# Patient Record
Sex: MALE | Race: WHITE | NOT HISPANIC OR LATINO | Employment: OTHER | URBAN - METROPOLITAN AREA
[De-identification: names, ages, dates, MRNs, and addresses within clinical notes are randomized per-mention and may not be internally consistent; named-entity substitution may affect disease eponyms.]

---

## 2017-02-01 ENCOUNTER — SEE NOTE (OUTPATIENT)
Dept: URBAN - METROPOLITAN AREA CLINIC 34 | Facility: CLINIC | Age: 82
Setting detail: DERMATOLOGY
End: 2017-02-01

## 2017-02-01 PROBLEM — L57.0 ACTINIC KERATOSIS: Status: RESOLVED | Noted: 2017-02-01

## 2017-02-01 PROBLEM — L71.8 OTHER ROSACEA: Status: RESOLVED | Noted: 2017-02-01

## 2017-02-01 PROBLEM — L57.0 ACTINIC KERATOSIS: Status: ACTIVE | Noted: 2017-02-01

## 2017-02-01 PROBLEM — L71.8 OTHER ROSACEA: Status: ACTIVE | Noted: 2017-02-01

## 2017-02-01 PROCEDURE — 17003 DESTRUCT PREMALG LES 2-14: CPT

## 2017-02-01 PROCEDURE — 17000 DESTRUCT PREMALG LESION: CPT

## 2017-02-01 PROCEDURE — 11307 SHAVE SKIN LESION 1.1-2.0 CM: CPT

## 2017-02-01 PROCEDURE — 99213 OFFICE O/P EST LOW 20 MIN: CPT

## 2017-02-01 RX ORDER — METRONIDAZOLE 7.5 MG/G
CREAM TOPICAL
Qty: 45 | Refills: 3 | Status: DISCONTINUED
Start: 2017-02-01 | End: 2017-12-27

## 2017-02-06 ENCOUNTER — RX ONLY (RX ONLY)
Age: 82
End: 2017-02-06

## 2017-02-06 RX ORDER — CLINDAMYCIN PHOSPHATE 10 MG/G
GEL TOPICAL
Qty: 60 | Refills: 2 | Status: DISCONTINUED
Start: 2017-02-06 | End: 2017-12-27

## 2017-02-07 ENCOUNTER — RX ONLY (RX ONLY)
Age: 82
End: 2017-02-07

## 2017-02-07 RX ORDER — METRONIDAZOLE 7.5 MG/G
CREAM TOPICAL
Qty: 45 | Refills: 3 | Status: DISCONTINUED
Start: 2017-02-07 | End: 2017-12-27

## 2017-02-14 ENCOUNTER — RX ONLY (RX ONLY)
Age: 82
End: 2017-02-14

## 2017-02-14 RX ORDER — METRONIDAZOLE 7.5 MG/G
GEL TOPICAL
Qty: 45 | Refills: 3 | Status: DISCONTINUED
Start: 2017-02-14 | End: 2017-12-27

## 2017-12-27 ENCOUNTER — SEE NOTE (OUTPATIENT)
Dept: URBAN - METROPOLITAN AREA CLINIC 34 | Facility: CLINIC | Age: 82
Setting detail: DERMATOLOGY
End: 2017-12-27

## 2017-12-27 ENCOUNTER — RX ONLY (RX ONLY)
Age: 82
End: 2017-12-27

## 2017-12-27 PROBLEM — L0390 682.9: Status: ACTIVE | Noted: 2017-12-27

## 2017-12-27 PROBLEM — L57.0 ACTINIC KERATOSIS: Status: RESOLVED | Noted: 2017-12-27

## 2017-12-27 PROBLEM — L0391 682.9: Status: ACTIVE | Noted: 2017-12-27

## 2017-12-27 PROBLEM — L57.0 ACTINIC KERATOSIS: Status: ACTIVE | Noted: 2017-12-27

## 2017-12-27 PROCEDURE — 17000 DESTRUCT PREMALG LESION: CPT

## 2017-12-27 PROCEDURE — 17003 DESTRUCT PREMALG LES 2-14: CPT

## 2017-12-27 PROCEDURE — 99213 OFFICE O/P EST LOW 20 MIN: CPT

## 2017-12-27 RX ORDER — CLINDAMYCIN PHOSPHATE 10 MG/ML
1 APPLICATION SOLUTION TOPICAL DAILY
Qty: 60 | Refills: 3 | Status: DISCONTINUED
Start: 2017-12-27 | End: 2017-12-27

## 2017-12-27 RX ORDER — CLINDAMYCIN PHOSPHATE 10 MG/ML
1 APPLICATION SOLUTION TOPICAL DAILY
Qty: 60 | Refills: 3
Start: 2017-12-27

## 2019-05-22 ENCOUNTER — RX ONLY (RX ONLY)
Age: 84
End: 2019-05-22

## 2019-05-22 ENCOUNTER — SKIN CANCER EXAM (OUTPATIENT)
Dept: URBAN - METROPOLITAN AREA CLINIC 34 | Facility: CLINIC | Age: 84
Setting detail: DERMATOLOGY
End: 2019-05-22

## 2019-05-22 DIAGNOSIS — L72.3 SEBACEOUS CYST: ICD-10-CM

## 2019-05-22 PROBLEM — L71.8 OTHER ROSACEA: Status: ACTIVE | Noted: 2019-05-22

## 2019-05-22 PROBLEM — L71.8 OTHER ROSACEA: Status: RESOLVED | Noted: 2019-05-22

## 2019-05-22 PROBLEM — L57.0 ACTINIC KERATOSIS: Status: RESOLVED | Noted: 2019-05-22

## 2019-05-22 PROCEDURE — 11102 TANGNTL BX SKIN SINGLE LES: CPT

## 2019-05-22 PROCEDURE — 17000 DESTRUCT PREMALG LESION: CPT

## 2019-05-22 PROCEDURE — 99214 OFFICE O/P EST MOD 30 MIN: CPT

## 2019-05-22 RX ORDER — METRONIDAZOLE 7.5 MG/G
1 APPLICATION GEL TOPICAL BID
Qty: 45 | Refills: 3 | Status: DISCONTINUED
Start: 2019-05-22 | End: 2019-05-29

## 2019-05-22 RX ORDER — METRONIDAZOLE 10 MG/G
1 APPLICATION GEL TOPICAL DAILY
Qty: 60 | Refills: 3
Start: 2019-05-22

## 2019-05-29 ENCOUNTER — RX ONLY (RX ONLY)
Age: 84
End: 2019-05-29

## 2019-05-29 RX ORDER — METRONIDAZOLE 7.5 MG/G
1 APPLICATION GEL TOPICAL BID
Qty: 45 | Refills: 3
Start: 2019-05-29

## 2021-06-09 ENCOUNTER — WORRISOME GROWTH - SEE NOTE (OUTPATIENT)
Dept: URBAN - METROPOLITAN AREA CLINIC 34 | Facility: CLINIC | Age: 86
Setting detail: DERMATOLOGY
End: 2021-06-09

## 2021-06-09 DIAGNOSIS — L71.8 OTHER ROSACEA: ICD-10-CM

## 2021-06-09 DIAGNOSIS — L56.8 OTHER SPECIFIED ACUTE SKIN CHANGES DUE TO ULTRAVIOLET RADIATION: ICD-10-CM

## 2021-06-09 PROBLEM — L57.0 ACTINIC KERATOSIS: Status: RESOLVED | Noted: 2021-06-09

## 2021-06-09 PROCEDURE — 17000 DESTRUCT PREMALG LESION: CPT

## 2021-06-09 PROCEDURE — 99214 OFFICE O/P EST MOD 30 MIN: CPT

## 2021-06-09 PROCEDURE — 17003 DESTRUCT PREMALG LES 2-14: CPT

## 2021-06-09 RX ORDER — METRONIDAZOLE 7.5 MG/G
1 A SMALL AMOUNT GEL TOPICAL TWICE A DAY
Qty: 45 | Refills: 2
Start: 2021-06-09

## 2021-06-09 RX ORDER — PERMETHRIN 50 MG/G
1 A SMALL AMOUNT CREAM TOPICAL
Qty: 60 | Refills: 0
Start: 2021-06-09

## 2021-06-11 ENCOUNTER — RX ONLY (RX ONLY)
Age: 86
End: 2021-06-11

## 2021-06-11 RX ORDER — PERMETHRIN 50 MG/G
1 A SMALL AMOUNT CREAM TOPICAL
Qty: 60 | Refills: 0
Start: 2021-06-11

## 2021-06-11 RX ORDER — METRONIDAZOLE 7.5 MG/G
1 A SMALL AMOUNT GEL TOPICAL TWICE A DAY
Qty: 45 | Refills: 2
Start: 2021-06-11

## 2021-06-16 ENCOUNTER — RX ONLY (RX ONLY)
Age: 86
End: 2021-06-16

## 2021-06-16 RX ORDER — PERMETHRIN 50 MG/G
1 A SMALL AMOUNT CREAM TOPICAL
Qty: 60 | Refills: 0
Start: 2021-06-16

## 2021-06-16 RX ORDER — METRONIDAZOLE 7.5 MG/G
1 A SMALL AMOUNT GEL TOPICAL TWICE A DAY
Qty: 45 | Refills: 2
Start: 2021-06-16

## 2021-07-07 ENCOUNTER — RX ONLY (RX ONLY)
Age: 86
End: 2021-07-07

## 2021-07-07 RX ORDER — METRONIDAZOLE 7.5 MG/G
1 A SMALL AMOUNT GEL TOPICAL TWICE A DAY
Qty: 45 | Refills: 2
Start: 2021-07-07

## 2021-07-07 RX ORDER — PERMETHRIN 50 MG/G
1 A SMALL AMOUNT CREAM TOPICAL
Qty: 60 | Refills: 0
Start: 2021-07-07

## 2021-07-13 ENCOUNTER — TELEPHONE ENCOUNTER (OUTPATIENT)
Dept: URBAN - METROPOLITAN AREA CLINIC 12 | Facility: CLINIC | Age: 86
End: 2021-07-13

## 2021-07-14 ENCOUNTER — TELEPHONE ENCOUNTER (OUTPATIENT)
Dept: URBAN - METROPOLITAN AREA CLINIC 92 | Facility: CLINIC | Age: 86
End: 2021-07-14

## 2021-07-15 ENCOUNTER — TELEPHONE ENCOUNTER (OUTPATIENT)
Dept: URBAN - METROPOLITAN AREA CLINIC 92 | Facility: CLINIC | Age: 86
End: 2021-07-15

## 2021-07-20 ENCOUNTER — OFFICE VISIT (OUTPATIENT)
Dept: URBAN - METROPOLITAN AREA CLINIC 12 | Facility: CLINIC | Age: 86
End: 2021-07-20
Payer: COMMERCIAL

## 2021-07-20 DIAGNOSIS — Z79.01 ANTICOAGULATED BY ANTICOAGULATION TREATMENT: ICD-10-CM

## 2021-07-20 DIAGNOSIS — R19.4 CHANGE IN BOWEL HABITS: ICD-10-CM

## 2021-07-20 DIAGNOSIS — R12 HEARTBURN: ICD-10-CM

## 2021-07-20 DIAGNOSIS — R15.2 FECAL URGENCY: ICD-10-CM

## 2021-07-20 DIAGNOSIS — K22.70 BARRETT'S ESOPHAGUS DETERMINED BY BIOPSY: ICD-10-CM

## 2021-07-20 DIAGNOSIS — Z86.711 HISTORY OF PULMONARY EMBOLISM: ICD-10-CM

## 2021-07-20 DIAGNOSIS — R19.7 DIARRHEA, UNSPECIFIED TYPE: ICD-10-CM

## 2021-07-20 PROBLEM — 16331000: Status: ACTIVE | Noted: 2021-07-20

## 2021-07-20 PROBLEM — 71820002: Status: ACTIVE | Noted: 2021-07-20

## 2021-07-20 PROCEDURE — 99214 OFFICE O/P EST MOD 30 MIN: CPT | Performed by: INTERNAL MEDICINE

## 2021-07-20 RX ORDER — LATANOPROST/PF 0.005 %
DROPS OPHTHALMIC (EYE)
Qty: 0 | Refills: 0 | Status: ACTIVE | COMMUNITY
Start: 1900-01-01

## 2021-07-20 RX ORDER — OMEPRAZOLE 20 MG/1
TABLET, DELAYED RELEASE ORAL
Qty: 0 | Refills: 0 | Status: ACTIVE | COMMUNITY
Start: 1900-01-01

## 2021-07-20 RX ORDER — DORZOLAMIDE HCL/PF 2 %
DROPS OPHTHALMIC (EYE)
Qty: 0 | Refills: 0 | Status: ACTIVE | COMMUNITY
Start: 1900-01-01

## 2021-07-20 RX ORDER — AMLODIPINE BESYLATE 5 MG/1
TABLET ORAL
Qty: 0 | Refills: 0 | Status: DISCONTINUED | COMMUNITY
Start: 1900-01-01

## 2021-07-20 RX ORDER — DONEPEZIL HYDROCHLORIDE 5 MG/1
1 TABLET AT BEDTIME TABLET, FILM COATED ORAL ONCE A DAY
Status: ACTIVE | COMMUNITY

## 2021-07-20 NOTE — HPI-TODAY'S VISIT:
This 85-year-old gentleman presents for GI evaluation. He was last seen here in the office on January 25, 2019. His weight at that visit was 152 pounds. His weight today on the same scale is 157.8 pounds. He said he has been trying to gain weight. There has been no abdominal pain but he has had problems with loose stools and diarrhea over the past 2 months or so. This has improved however the last week. Since he did have episode of abdominal discomfort as well and possible nausea and went to the emergency room as directed. I have reviewed these records and laboratory tests were largely unremarkable. He also had a CT scan of the abdomen performed at [facility] on July 13, 2021 without contrast which was negative for acute changes.   He states his bowel habits are regular part of this based noted that changes over the past couple of months but has had some improvement over the last week. There has been no rectal bleeding. He has had fecal urgency where he has a rash to the bathroom and even had a couple episodes of fecal incontinence. However he is not had this in the last week. He also has had some bloating or flatulence. No recent foreign travel or antibiotics or new medications. No herbal medications.   He says he has not tried probiotics or new medications regularly.   He has had history of adenomatous colon polyps and diverticulosis known on colonoscopy in the past with his last colonoscopy 2016.   He has a long history of heartburn and history of Aden's esophagus who had been on omeprazole daily. There's been no dysphagia or early satiety or odynophagia abdominal pain or nausea or vomiting or fever chills or sweats. There has been no anemia.   There has been no chest pain or shortness of breath. No palpitations.   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

## 2021-07-22 ENCOUNTER — LAB OUTSIDE AN ENCOUNTER (OUTPATIENT)
Dept: URBAN - METROPOLITAN AREA CLINIC 12 | Facility: CLINIC | Age: 86
End: 2021-07-22

## 2021-07-27 LAB — GASTROINTESTINAL PATHOGEN: (no result)

## 2021-10-04 ENCOUNTER — OFFICE VISIT (OUTPATIENT)
Dept: URBAN - METROPOLITAN AREA CLINIC 12 | Facility: CLINIC | Age: 86
End: 2021-10-04
Payer: COMMERCIAL

## 2021-10-04 DIAGNOSIS — Z86.711 HISTORY OF PULMONARY EMBOLISM: ICD-10-CM

## 2021-10-04 DIAGNOSIS — K22.70 BARRETT'S ESOPHAGUS DETERMINED BY BIOPSY: ICD-10-CM

## 2021-10-04 DIAGNOSIS — Z79.01 ANTICOAGULATED BY ANTICOAGULATION TREATMENT: ICD-10-CM

## 2021-10-04 DIAGNOSIS — K64.8 INTERNAL HEMORRHOIDS: ICD-10-CM

## 2021-10-04 DIAGNOSIS — R19.7 DIARRHEA, UNSPECIFIED TYPE: ICD-10-CM

## 2021-10-04 PROBLEM — 90458007: Status: ACTIVE | Noted: 2021-10-04

## 2021-10-04 PROBLEM — 62315008: Status: ACTIVE | Noted: 2021-07-20

## 2021-10-04 PROBLEM — 302914006: Status: ACTIVE | Noted: 2021-07-20

## 2021-10-04 PROCEDURE — 99213 OFFICE O/P EST LOW 20 MIN: CPT | Performed by: INTERNAL MEDICINE

## 2021-10-04 RX ORDER — DORZOLAMIDE HCL/PF 2 %
DROPS OPHTHALMIC (EYE)
Qty: 0 | Refills: 0 | Status: ACTIVE | COMMUNITY
Start: 1900-01-01

## 2021-10-04 RX ORDER — LATANOPROST/PF 0.005 %
DROPS OPHTHALMIC (EYE)
Qty: 0 | Refills: 0 | Status: ACTIVE | COMMUNITY
Start: 1900-01-01

## 2021-10-04 RX ORDER — DONEPEZIL HYDROCHLORIDE 5 MG/1
1 TABLET AT BEDTIME TABLET, FILM COATED ORAL ONCE A DAY
Status: ACTIVE | COMMUNITY

## 2021-10-04 RX ORDER — OMEPRAZOLE 20 MG/1
TABLET, DELAYED RELEASE ORAL
Qty: 0 | Refills: 0 | Status: ACTIVE | COMMUNITY
Start: 1900-01-01

## 2021-10-04 NOTE — HPI-TODAY'S VISIT:
86-year-old gentleman presents for follow up in the clinic. He was last seen here on July 20, 2021. At that time he was having change of bowel habits and loose stools and diarrhea. He said he had some sort of food poisoning. Nonetheless his symptoms all improved and this seemed to occur after he was advised to start taking align probiotic daily as I had discussed with him in the clinic. He says he has been doing this now since his last visit here and states a feels much better. He says he has 2 more weeks of align left at home. There's been no belly pain no fever chills sweats no rectal bleeding no change in bowel habits besides improvement back to normal. He says that he does notice that 9/10 bowel movements are now regular and 1/10 he will notice some liquid after wiping. He does have a history of hemorrhoids as well. This was noted with internal hemorrhoids as last colonoscopy 5 years ago. There has been no chest pain or shortness breath or palpitations or fatigue increased.  He did have stool studies which were performed and were negative for infection on July 27, 2021.   He has a history of Aden's esophagus in heartburn does take omeprazole for this daily 20 mg. He is also on Xarelto due to history of pulmonary embolism. He does see his primary care physician regularly for routine healthcare maintenance Dr. Troy Falcon.

## 2022-09-19 NOTE — PHYSICAL EXAM EYES:
conjuntivae and eyelids appear normal, anicteric sclera
Bleeding that does not stop/Swelling that gets worse/Pain not relieved by Medications/Fever greater than (need to indicate Fahrenheit or Celsius)/Wound/Surgical Site with redness, or foul smelling discharge or pus/Numbness, tingling, color or temperature change to extremity/Nausea and vomiting that does not stop/Unable to urinate/Excessive diarrhea/Inability to tolerate liquids or foods/Increased irritability or sluggishness

## 2022-09-30 ENCOUNTER — WEB ENCOUNTER (OUTPATIENT)
Dept: URBAN - METROPOLITAN AREA CLINIC 12 | Facility: CLINIC | Age: 87
End: 2022-09-30

## 2022-09-30 ENCOUNTER — OFFICE VISIT (OUTPATIENT)
Dept: URBAN - METROPOLITAN AREA CLINIC 12 | Facility: CLINIC | Age: 87
End: 2022-09-30
Payer: COMMERCIAL

## 2022-09-30 VITALS
SYSTOLIC BLOOD PRESSURE: 134 MMHG | HEIGHT: 70 IN | DIASTOLIC BLOOD PRESSURE: 75 MMHG | BODY MASS INDEX: 21.13 KG/M2 | WEIGHT: 147.6 LBS | HEART RATE: 68 BPM | TEMPERATURE: 97.3 F

## 2022-09-30 DIAGNOSIS — R49.0 HOARSENESS: ICD-10-CM

## 2022-09-30 DIAGNOSIS — Z79.01 ANTICOAGULATED BY ANTICOAGULATION TREATMENT: ICD-10-CM

## 2022-09-30 DIAGNOSIS — Z86.711 HISTORY OF PULMONARY EMBOLISM: ICD-10-CM

## 2022-09-30 DIAGNOSIS — R15.0 INCOMPLETE DEFECATION: ICD-10-CM

## 2022-09-30 DIAGNOSIS — Z87.19 HISTORY OF GASTROESOPHAGEAL REFLUX (GERD): ICD-10-CM

## 2022-09-30 DIAGNOSIS — K44.9 HIATAL HERNIA: ICD-10-CM

## 2022-09-30 PROBLEM — 50219008: Status: ACTIVE | Noted: 2022-09-30

## 2022-09-30 PROBLEM — 161512007: Status: ACTIVE | Noted: 2021-07-20

## 2022-09-30 PROBLEM — 84089009: Status: ACTIVE | Noted: 2022-09-30

## 2022-09-30 PROBLEM — 70861000119106: Status: ACTIVE | Noted: 2022-09-30

## 2022-09-30 PROBLEM — 309298003: Status: ACTIVE | Noted: 2021-07-20

## 2022-09-30 PROCEDURE — 99213 OFFICE O/P EST LOW 20 MIN: CPT | Performed by: INTERNAL MEDICINE

## 2022-09-30 RX ORDER — OMEPRAZOLE 20 MG/1
TABLET, DELAYED RELEASE ORAL
Qty: 0 | Refills: 0 | Status: ACTIVE | COMMUNITY
Start: 1900-01-01

## 2022-09-30 RX ORDER — LATANOPROST/PF 0.005 %
DROPS OPHTHALMIC (EYE)
Qty: 0 | Refills: 0 | Status: ACTIVE | COMMUNITY
Start: 1900-01-01

## 2022-09-30 RX ORDER — DONEPEZIL HYDROCHLORIDE 5 MG/1
1 TABLET AT BEDTIME TABLET, FILM COATED ORAL ONCE A DAY
Status: DISCONTINUED | COMMUNITY

## 2022-09-30 RX ORDER — DORZOLAMIDE HCL/PF 2 %
DROPS OPHTHALMIC (EYE)
Qty: 0 | Refills: 0 | Status: ACTIVE | COMMUNITY
Start: 1900-01-01

## 2022-09-30 NOTE — HPI-TODAY'S VISIT:
87M presents for evaliuation.  having some flatus and passing small stool piece at times and rare fecal incontinence. has been noticing this over several months or more.   prevoiusly had heartburn and reflux but that has improved sicne he has been on omeprazole 20mg   states that he has had hoarseness of voice which is new over past 3-4 months.  has not seen ENT.  had EGD/colonosocpy in 2016 without major abnormalities.   on Xarelto for PE

## 2023-08-10 ENCOUNTER — WEB ENCOUNTER (OUTPATIENT)
Dept: URBAN - METROPOLITAN AREA CLINIC 12 | Facility: CLINIC | Age: 88
End: 2023-08-10

## 2023-08-21 ENCOUNTER — DASHBOARD ENCOUNTERS (OUTPATIENT)
Age: 88
End: 2023-08-21

## 2023-08-21 ENCOUNTER — OFFICE VISIT (OUTPATIENT)
Dept: URBAN - METROPOLITAN AREA CLINIC 111 | Facility: CLINIC | Age: 88
End: 2023-08-21
Payer: COMMERCIAL

## 2023-08-21 VITALS
TEMPERATURE: 97.9 F | HEIGHT: 70 IN | DIASTOLIC BLOOD PRESSURE: 67 MMHG | BODY MASS INDEX: 21.33 KG/M2 | SYSTOLIC BLOOD PRESSURE: 111 MMHG | HEART RATE: 72 BPM | WEIGHT: 149 LBS

## 2023-08-21 DIAGNOSIS — R15.9 FECAL SOILING DUE TO FECAL INCONTINENCE: ICD-10-CM

## 2023-08-21 DIAGNOSIS — R14.3 FLATUS: ICD-10-CM

## 2023-08-21 PROBLEM — 142241000119104: Status: ACTIVE | Noted: 2023-08-21

## 2023-08-21 PROCEDURE — 99213 OFFICE O/P EST LOW 20 MIN: CPT | Performed by: NURSE PRACTITIONER

## 2023-08-21 RX ORDER — OMEPRAZOLE 20 MG/1
TABLET, DELAYED RELEASE ORAL
Qty: 0 | Refills: 0 | Status: ACTIVE | COMMUNITY
Start: 1900-01-01

## 2023-08-21 RX ORDER — LATANOPROST/PF 0.005 %
DROPS OPHTHALMIC (EYE)
Qty: 0 | Refills: 0 | Status: ACTIVE | COMMUNITY
Start: 1900-01-01

## 2023-08-21 RX ORDER — DORZOLAMIDE HCL/PF 2 %
DROPS OPHTHALMIC (EYE)
Qty: 0 | Refills: 0 | Status: ACTIVE | COMMUNITY
Start: 1900-01-01

## 2023-08-21 NOTE — HPI-TODAY'S VISIT:
88 yo male presents for flatus and fecal incontinence. Denies abd pain, nausea, vomiting or blood in stool. Patient was last seen in 9/22 by Dr. Cook for flatus and passing sm stool pieces. Last EGD/colonosocpy in 2016 negative. He is on Xarelto for PE.

## 2023-09-05 ENCOUNTER — OFFICE VISIT (OUTPATIENT)
Dept: URBAN - METROPOLITAN AREA CLINIC 12 | Facility: CLINIC | Age: 88
End: 2023-09-05